# Patient Record
Sex: FEMALE | Race: WHITE
[De-identification: names, ages, dates, MRNs, and addresses within clinical notes are randomized per-mention and may not be internally consistent; named-entity substitution may affect disease eponyms.]

---

## 2018-12-18 ENCOUNTER — HOSPITAL ENCOUNTER (EMERGENCY)
Dept: HOSPITAL 72 - EMR | Age: 43
Discharge: HOME | End: 2018-12-18
Payer: MEDICARE

## 2018-12-18 VITALS — DIASTOLIC BLOOD PRESSURE: 72 MMHG | SYSTOLIC BLOOD PRESSURE: 110 MMHG

## 2018-12-18 VITALS — DIASTOLIC BLOOD PRESSURE: 88 MMHG | SYSTOLIC BLOOD PRESSURE: 120 MMHG

## 2018-12-18 VITALS — WEIGHT: 150 LBS | BODY MASS INDEX: 29.45 KG/M2 | HEIGHT: 60 IN

## 2018-12-18 VITALS — DIASTOLIC BLOOD PRESSURE: 60 MMHG | SYSTOLIC BLOOD PRESSURE: 97 MMHG

## 2018-12-18 DIAGNOSIS — F20.9: ICD-10-CM

## 2018-12-18 DIAGNOSIS — J44.9: ICD-10-CM

## 2018-12-18 DIAGNOSIS — F41.9: ICD-10-CM

## 2018-12-18 DIAGNOSIS — K21.9: ICD-10-CM
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
## 2018-12-18 DIAGNOSIS — F32.9: ICD-10-CM

## 2018-12-18 DIAGNOSIS — S09.90XA: Primary | ICD-10-CM

## 2018-12-18 DIAGNOSIS — G47.00: ICD-10-CM
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
## 2018-12-18 LAB
ADD MANUAL DIFF: NO
ALBUMIN SERPL-MCNC: 3.8 G/DL (ref 3.4–5)
ALBUMIN/GLOB SERPL: 0.8 {RATIO} (ref 1–2.7)
ALP SERPL-CCNC: 93 U/L (ref 46–116)
ALT SERPL-CCNC: 27 U/L (ref 12–78)
ANION GAP SERPL CALC-SCNC: 8 MMOL/L (ref 5–15)
APPEARANCE UR: (no result)
APTT PPP: (no result) S
AST SERPL-CCNC: 19 U/L (ref 15–37)
BASOPHILS NFR BLD AUTO: 1 % (ref 0–2)
BILIRUB SERPL-MCNC: 0.5 MG/DL (ref 0.2–1)
BUN SERPL-MCNC: 10 MG/DL (ref 7–18)
CALCIUM SERPL-MCNC: 9.4 MG/DL (ref 8.5–10.1)
CHLORIDE SERPL-SCNC: 104 MMOL/L (ref 98–107)
CO2 SERPL-SCNC: 28 MMOL/L (ref 21–32)
CREAT SERPL-MCNC: 0.7 MG/DL (ref 0.55–1.3)
EOSINOPHIL NFR BLD AUTO: 1.9 % (ref 0–3)
ERYTHROCYTE [DISTWIDTH] IN BLOOD BY AUTOMATED COUNT: 12.3 % (ref 11.6–14.8)
GLOBULIN SER-MCNC: 4.9 G/DL
GLUCOSE UR STRIP-MCNC: NEGATIVE MG/DL
HCT VFR BLD CALC: 48.4 % (ref 37–47)
HGB BLD-MCNC: 15.3 G/DL (ref 12–16)
KETONES UR QL STRIP: NEGATIVE
LEUKOCYTE ESTERASE UR QL STRIP: (no result)
LYMPHOCYTES NFR BLD AUTO: 26.5 % (ref 20–45)
MCV RBC AUTO: 87 FL (ref 80–99)
MONOCYTES NFR BLD AUTO: 9 % (ref 1–10)
NEUTROPHILS NFR BLD AUTO: 61.7 % (ref 45–75)
NITRITE UR QL STRIP: NEGATIVE
PH UR STRIP: 7 [PH] (ref 4.5–8)
PLATELET # BLD: 324 K/UL (ref 150–450)
POTASSIUM SERPL-SCNC: 3.5 MMOL/L (ref 3.5–5.1)
PROT UR QL STRIP: NEGATIVE
RBC # BLD AUTO: 5.55 M/UL (ref 4.2–5.4)
SODIUM SERPL-SCNC: 140 MMOL/L (ref 136–145)
SP GR UR STRIP: 1.01 (ref 1–1.03)
UROBILINOGEN UR-MCNC: NORMAL MG/DL (ref 0–1)
WBC # BLD AUTO: 7.9 K/UL (ref 4.8–10.8)

## 2018-12-18 PROCEDURE — 84443 ASSAY THYROID STIM HORMONE: CPT

## 2018-12-18 PROCEDURE — 84484 ASSAY OF TROPONIN QUANT: CPT

## 2018-12-18 PROCEDURE — 81001 URINALYSIS AUTO W/SCOPE: CPT

## 2018-12-18 PROCEDURE — 80053 COMPREHEN METABOLIC PANEL: CPT

## 2018-12-18 PROCEDURE — 85025 COMPLETE CBC W/AUTO DIFF WBC: CPT

## 2018-12-18 PROCEDURE — 81025 URINE PREGNANCY TEST: CPT

## 2018-12-18 PROCEDURE — 99284 EMERGENCY DEPT VISIT MOD MDM: CPT

## 2018-12-18 PROCEDURE — 70450 CT HEAD/BRAIN W/O DYE: CPT

## 2018-12-18 PROCEDURE — 36415 COLL VENOUS BLD VENIPUNCTURE: CPT

## 2018-12-18 NOTE — EMERGENCY ROOM REPORT
History of Present Illness


General


Chief Complaint:  Multiple Trauma/Fall


Source:  Medical Record





Present Illness


HPI


Patient is a 43-year-old female who presented after a fall.Patient reportedly 

fell at her facility.  Patient was noted to be ambulatory after the fall.  

Patient denies any current complaints.  History is markedly limited by patient'

s poor cooperation.  Patient had prior history of psychiatric disease. She was 

sent in by Dr. Mayorga.


Allergies:  


Coded Allergies:  


     No Known Allergies (Unverified , 12/18/18)





Patient History


Past Medical History:  see triage record


Past Surgical History:  none


Reviewed Nursing Documentation:  PMH: Agreed; PSxH: Agreed





Nursing Documentation-PMH


Hx Hypertension:  Yes


Hx COPD:  Yes


Hx Gastrointestinal Problems:  Yes - GERD


History Of Psychiatric Problem:  Yes - depression, insomnia, anxiety, 

schizoprenia


Hx Neurological Problems:  No - unsteadiness on feet, hx of fall





Review of Systems


All Other Systems:  limited - by poor historian





Physical Exam





Vital Signs








  Date Time  Temp Pulse Resp B/P (MAP) Pulse Ox O2 Delivery O2 Flow Rate FiO2


 


12/18/18 10:24 98.6 75 18 95/58 94 Room Air  








General Appearance:  well appearing, no apparent distress, alert, non-toxic


Head:  normocephalic, atraumatic


ENT:  hearing grossly normal, normal voice


Neck:  full range of motion, supple


Respiratory:  lungs clear, no respiratory distress, speaking full sentences


Cardiovascular #1:  normal peripheral pulses, regular rate, rhythm


Gastrointestinal:  normal inspection


Musculoskeletal:  normal inspection


Neurologic:  normal inspection, alert, responsive, CNs III-XII nml as tested, 

normal gait


Psychiatric:  mood/affect normal


Skin:  no rash





Medical Decision Making


Diagnostic Impression:  


 Primary Impression:  


 Fall


ER Course


Patient presented for fall.Differential diagnosis included was not limited to 

neck fracture, CVA, close head injury, syncopal episode, basilar ischemia.  

Because of complexity of patient's case laboratory testing and imaging studies 

were ordered.  Laboratory testing was unremarkable.  Patient was awake and 

alert.  Patient appears to have chronic psychosis.  Imaging studies were 

unremarkable.  Patient was sent back to her nursing facility., She was to 

return for persistent vomiting, altered mental status or other concerns. 

Patient was discharged via BLS ambulance





Labs








Test


  12/18/18


11:00 12/18/18


13:30


 


Urine Color Pale yellow  


 


Urine Appearance


  Slightly


cloudy 


 


 


Urine pH 7 (4.5-8.0)  


 


Urine Specific Gravity


  1.010


(1.005-1.035) 


 


 


Urine Protein


  Negative


(NEGATIVE) 


 


 


Urine Glucose (UA)


  Negative


(NEGATIVE) 


 


 


Urine Ketones


  Negative


(NEGATIVE) 


 


 


Urine Blood 5+ (NEGATIVE)  


 


Urine Nitrite


  Negative


(NEGATIVE) 


 


 


Urine Bilirubin


  Negative


(NEGATIVE) 


 


 


Urine Urobilinogen


  Normal MG/DL


(0.0-1.0) 


 


 


Urine Leukocyte Esterase 1+ (NEGATIVE)  


 


Urine RBC


  20-30 /HPF (0


- 2) 


 


 


Urine WBC


  0-2 /HPF (0 -


2) 


 


 


Urine Squamous Epithelial


Cells Few /LPF


(NONE/OCC) 


 


 


Urine Amorphous Sediment


  Few /LPF


(NONE) 


 


 


Urine Bacteria


  Few /HPF


(NONE) 


 


 


Urine HCG, Qualitative


  Negative


(NEGATIVE) 


 


 


White Blood Count


  


  7.9 K/UL


(4.8-10.8)


 


Red Blood Count


  


  5.55 M/UL


(4.20-5.40)


 


Hemoglobin


  


  15.3 G/DL


(12.0-16.0)


 


Hematocrit


  


  48.4 %


(37.0-47.0)


 


Mean Corpuscular Volume  87 FL (80-99) 


 


Mean Corpuscular Hemoglobin


  


  27.5 PG


(27.0-31.0)


 


Mean Corpuscular Hemoglobin


Concent 


  31.6 G/DL


(32.0-36.0)


 


Red Cell Distribution Width


  


  12.3 %


(11.6-14.8)


 


Platelet Count


  


  324 K/UL


(150-450)


 


Mean Platelet Volume


  


  6.1 FL


(6.5-10.1)


 


Neutrophils (%) (Auto)


  


  61.7 %


(45.0-75.0)


 


Lymphocytes (%) (Auto)


  


  26.5 %


(20.0-45.0)


 


Monocytes (%) (Auto)


  


  9.0 %


(1.0-10.0)


 


Eosinophils (%) (Auto)


  


  1.9 %


(0.0-3.0)


 


Basophils (%) (Auto)


  


  1.0 %


(0.0-2.0)


 


Sodium Level


  


  140 MMOL/L


(136-145)


 


Potassium Level


  


  3.5 MMOL/L


(3.5-5.1)


 


Chloride Level


  


  104 MMOL/L


()


 


Carbon Dioxide Level


  


  28 MMOL/L


(21-32)


 


Anion Gap


  


  8 mmol/L


(5-15)


 


Blood Urea Nitrogen


  


  10 mg/dL


(7-18)


 


Creatinine


  


  0.7 MG/DL


(0.55-1.30)


 


Estimat Glomerular Filtration


Rate 


  > 60 mL/min


(>60)


 


Glucose Level


  


  83 MG/DL


()


 


Calcium Level


  


  9.4 MG/DL


(8.5-10.1)


 


Total Bilirubin


  


  0.5 MG/DL


(0.2-1.0)


 


Aspartate Amino Transf


(AST/SGOT) 


  19 U/L (15-37) 


 


 


Alanine Aminotransferase


(ALT/SGPT) 


  27 U/L (12-78) 


 


 


Alkaline Phosphatase


  


  93 U/L


()


 


Troponin I


  


  0.000 ng/mL


(0.000-0.056)


 


Total Protein


  


  8.7 G/DL


(6.4-8.2)


 


Albumin


  


  3.8 G/DL


(3.4-5.0)


 


Globulin  4.9 g/dL 


 


Albumin/Globulin Ratio  0.8 (1.0-2.7) 


 


Thyroid Stimulating Hormone


(TSH) 


  1.246 uiU/mL


(0.358-3.740)











Last Vital Signs








  Date Time  Temp Pulse Resp B/P (MAP) Pulse Ox O2 Delivery O2 Flow Rate FiO2


 


12/18/18 10:34  74 20   Room Air  


 


12/18/18 10:34 98.6   97/60 96   








Status:  improved


Disposition:  HOME, SELF-CARE


Condition:  Stable


Referrals:  


Kareem Mayorga MD (PCP)











Reza Mariscal MD Dec 18, 2018 12:41

## 2018-12-18 NOTE — DIAGNOSTIC IMAGING REPORT
Indications: Head pain, status post fall, head trauma

 

Technique: Spiral acquisitions obtained through the brain. Angled axial and coronal 5

x 5 mm slices were reconstructed. Total dose length product 1362.01 mGycm.  CTDI

vol(s) 70.38 mGy. Dose reduction achieved using automated exposure control

 

Comparison: None.

 

Findings: There is prominence of the ventricles and minimally of the extra axial CSF

spaces. Normal gray-white differentiation. No acute intracranial hemorrhage nor

edema. No mass effect nor midline shift. There is minimal posterior parietal scalp

soft tissue swelling in the midline. Visualized orbits and sinuses are unremarkable.

The mastoids are clear.

 

Impression: Negative for acute intracranial bleed or mass effect

 

Prominence to the the ventricles and extra-axial CSF spaces, particularly the former,

consistent with volume loss, somewhat out of proportion to age. Ventriculomegaly is

somewhat out of proportion to degree of sulcal dilatation, and while doubtful,

component of hydrocephalus not completely excludable

 

Evidence of posterior parietal scalp soft tissue trauma. No underlying bony injury

 

 

 

 

 

The CT scanner at [facility] is accredited by the [employer]

[employer]iology and the scans are performed using protocols designed to limit radiation

exposure to as low as reasonably achievable to attain images of sufficient resolution

adequate for diagnostic evaluation.